# Patient Record
Sex: FEMALE | Race: WHITE
[De-identification: names, ages, dates, MRNs, and addresses within clinical notes are randomized per-mention and may not be internally consistent; named-entity substitution may affect disease eponyms.]

---

## 2019-06-22 ENCOUNTER — HOSPITAL ENCOUNTER (OUTPATIENT)
Dept: HOSPITAL 53 - M RAD | Age: 54
End: 2019-06-22
Attending: PHYSICIAN ASSISTANT
Payer: COMMERCIAL

## 2019-06-22 DIAGNOSIS — M25.549: Primary | ICD-10-CM

## 2019-06-23 NOTE — REP
Right hand four views:

There is soft tissue edema over the dorsum.

There is no fracture or dislocation.

Mineralization and joint spaces are normal.

There are no calcifications or foreign bodies.

Impression:

Soft tissue edema over the dorsum.

No fracture.

 

 

Electronically Signed by

Dangelo Guzman MD 06/23/2019 08:24 A

## 2024-08-25 ENCOUNTER — HOSPITAL ENCOUNTER (EMERGENCY)
Dept: HOSPITAL 53 - M ED | Age: 59
Discharge: HOME | End: 2024-08-25
Payer: SELF-PAY

## 2024-08-25 VITALS — BODY MASS INDEX: 41.41 KG/M2 | HEIGHT: 63 IN | WEIGHT: 233.69 LBS

## 2024-08-25 VITALS — SYSTOLIC BLOOD PRESSURE: 152 MMHG | DIASTOLIC BLOOD PRESSURE: 70 MMHG | OXYGEN SATURATION: 97 % | TEMPERATURE: 97.6 F

## 2024-08-25 DIAGNOSIS — Z88.8: ICD-10-CM

## 2024-08-25 DIAGNOSIS — E66.9: ICD-10-CM

## 2024-08-25 DIAGNOSIS — Z88.1: ICD-10-CM

## 2024-08-25 DIAGNOSIS — K81.0: Primary | ICD-10-CM

## 2024-08-25 LAB
ALBUMIN SERPL BCG-MCNC: 3.6 G/DL (ref 3.2–5.2)
ALP SERPL-CCNC: 160 U/L (ref 46–116)
ALT SERPL W P-5'-P-CCNC: 61 U/L (ref 7–40)
AST SERPL-CCNC: 83 U/L (ref ?–34)
BASOPHILS # BLD AUTO: 0.1 10^3/UL (ref 0–0.2)
BASOPHILS NFR BLD AUTO: 0.5 % (ref 0–1)
BILIRUB CONJ SERPL-MCNC: 0.4 MG/DL (ref ?–0.4)
BILIRUB SERPL-MCNC: 0.8 MG/DL (ref 0.3–1.2)
BUN SERPL-MCNC: 13 MG/DL (ref 9–23)
CALCIUM SERPL-MCNC: 9.6 MG/DL (ref 8.5–10.1)
CHLORIDE SERPL-SCNC: 108 MMOL/L (ref 98–107)
CK MB CFR.DF SERPL CALC: 1.03
CK MB SERPL-MCNC: < 1 NG/ML (ref ?–3.6)
CK SERPL-CCNC: 97 U/L (ref 34–145)
CO2 SERPL-SCNC: 25 MMOL/L (ref 20–31)
CREAT SERPL-MCNC: 0.69 MG/DL (ref 0.55–1.3)
EOSINOPHIL # BLD AUTO: 0.1 10^3/UL (ref 0–0.5)
EOSINOPHIL NFR BLD AUTO: 0.4 % (ref 0–3)
GFR SERPL CREATININE-BSD FRML MDRD: > 60 ML/MIN/{1.73_M2} (ref 51–?)
GLUCOSE SERPL-MCNC: 175 MG/DL (ref 60–100)
HCT VFR BLD AUTO: 39.3 % (ref 36–47)
HGB BLD-MCNC: 13.1 G/DL (ref 12–15.5)
LIPASE SERPL-CCNC: 39 U/L (ref 12–53)
LYMPHOCYTES # BLD AUTO: 1.1 10^3/UL (ref 1.5–5)
LYMPHOCYTES NFR BLD AUTO: 8.7 % (ref 24–44)
MCH RBC QN AUTO: 29.7 PG (ref 27–33)
MCHC RBC AUTO-ENTMCNC: 33.3 G/DL (ref 32–36.5)
MCV RBC AUTO: 89.1 FL (ref 80–96)
MONOCYTES # BLD AUTO: 0.7 10^3/UL (ref 0–0.8)
MONOCYTES NFR BLD AUTO: 5.4 % (ref 2–8)
NEUTROPHILS # BLD AUTO: 11 10^3/UL (ref 1.5–8.5)
NEUTROPHILS NFR BLD AUTO: 84.5 % (ref 36–66)
PLATELET # BLD AUTO: 326 10^3/UL (ref 150–450)
POTASSIUM SERPL-SCNC: 4.2 MMOL/L (ref 3.5–5.1)
PROT SERPL-MCNC: 7.5 G/DL (ref 5.7–8.2)
RBC # BLD AUTO: 4.41 10^6/UL (ref 4–5.4)
SODIUM SERPL-SCNC: 137 MMOL/L (ref 136–145)
WBC # BLD AUTO: 13 10^3/UL (ref 4–10)